# Patient Record
Sex: MALE | Race: WHITE | NOT HISPANIC OR LATINO | Employment: FULL TIME | URBAN - NONMETROPOLITAN AREA
[De-identification: names, ages, dates, MRNs, and addresses within clinical notes are randomized per-mention and may not be internally consistent; named-entity substitution may affect disease eponyms.]

---

## 2017-07-25 ENCOUNTER — OFFICE VISIT (OUTPATIENT)
Dept: CARDIOLOGY | Facility: CLINIC | Age: 42
End: 2017-07-25

## 2017-07-25 VITALS
DIASTOLIC BLOOD PRESSURE: 91 MMHG | BODY MASS INDEX: 37.19 KG/M2 | SYSTOLIC BLOOD PRESSURE: 153 MMHG | HEART RATE: 81 BPM | OXYGEN SATURATION: 96 % | HEIGHT: 77 IN | WEIGHT: 315 LBS

## 2017-07-25 DIAGNOSIS — R06.02 SHORTNESS OF BREATH: ICD-10-CM

## 2017-07-25 DIAGNOSIS — R53.83 FATIGUE, UNSPECIFIED TYPE: ICD-10-CM

## 2017-07-25 DIAGNOSIS — I10 ESSENTIAL HYPERTENSION: ICD-10-CM

## 2017-07-25 PROCEDURE — 99203 OFFICE O/P NEW LOW 30 MIN: CPT | Performed by: PHYSICIAN ASSISTANT

## 2017-07-25 PROCEDURE — 93000 ELECTROCARDIOGRAM COMPLETE: CPT | Performed by: PHYSICIAN ASSISTANT

## 2017-07-25 RX ORDER — MELOXICAM 15 MG/1
15 TABLET ORAL DAILY PRN
COMMUNITY

## 2017-07-25 RX ORDER — LISINOPRIL 20 MG/1
20 TABLET ORAL DAILY
COMMUNITY

## 2017-07-25 RX ORDER — ATORVASTATIN CALCIUM 20 MG/1
20 TABLET, FILM COATED ORAL NIGHTLY
COMMUNITY

## 2017-07-25 RX ORDER — CITALOPRAM 20 MG/1
20 TABLET ORAL DAILY
COMMUNITY

## 2017-07-25 NOTE — PROGRESS NOTES
Subjective   Richie Ahumada is a 42 y.o. male     Chief Complaint   Patient presents with   • Establish Care   • Fatigue       HPI  The patient presents today for initial evaluation.  He presents because of fatigue and shortness of air.  His concern is with his family history from cardiac standpoint.  The patient denies cardiac issues in the past.  He is followed for hypertension, dyslipidemia, and sleep apnea.  Recently, his fatigue is become significant.  He reports that his exercise capacity is very low because of the level of fatigue.  He does not recover very quickly either.  With levels of activity, he will develop shortness of air as well.  He has had no symptoms of chest pain.  He relates no PND orthopnea.  He has minimal lower extremity edema.  Blood pressures typically are well controlled when checked on current medical regimen.  Recent labs including lipid parameters, thyroid, hematologic panel, etc. all were normal by patient report.  He has no further complaints otherwise and again is referred in the setting of all the above.      Current Outpatient Prescriptions   Medication Sig Dispense Refill   • atorvastatin (LIPITOR) 20 MG tablet Take 20 mg by mouth Every Night.     • citalopram (CeleXA) 20 MG tablet Take 20 mg by mouth Daily.     • lisinopril (PRINIVIL,ZESTRIL) 20 MG tablet Take 20 mg by mouth Daily.     • meloxicam (MOBIC) 15 MG tablet Take 15 mg by mouth Daily As Needed.       No current facility-administered medications for this visit.        Review of patient's allergies indicates no known allergies.    Past Medical History:   Diagnosis Date   • Fatigue    • Hypertension    • Sleep apnea     c-pap, not followed by pulm.       Social History     Social History   • Marital status:      Spouse name: N/A   • Number of children: N/A   • Years of education: N/A     Occupational History   • Not on file.     Social History Main Topics   • Smoking status: Never Smoker   • Smokeless tobacco: Not  "on file   • Alcohol use No   • Drug use: No   • Sexual activity: Not on file     Other Topics Concern   • Not on file     Social History Narrative   • No narrative on file       Family History   Problem Relation Age of Onset   • Thyroid cancer Mother    • Hypertension Father    • Heart attack Father        Review of Systems   Constitutional: Positive for fatigue.   HENT: Negative.    Eyes: Negative.    Respiratory: Negative.    Cardiovascular: Negative.    Gastrointestinal: Negative.    Endocrine: Negative.    Genitourinary: Negative.    Musculoskeletal: Positive for arthralgias and myalgias.   Skin: Negative.    Allergic/Immunologic: Positive for environmental allergies.   Neurological: Negative.    Hematological: Negative.    Psychiatric/Behavioral: Negative.        Objective     /91 (BP Location: Left arm, Patient Position: Sitting)  Pulse 81  Ht 77\" (195.6 cm)  Wt (!) 454 lb 6.4 oz (206 kg)  SpO2 96%  BMI 53.88 kg/m2    Lab Results (most recent)     None          Physical Exam   Constitutional: He is oriented to person, place, and time. He appears well-developed and well-nourished. No distress.   HENT:   Head: Normocephalic and atraumatic.   Eyes: Conjunctivae and EOM are normal. Pupils are equal, round, and reactive to light.   Neck: Normal range of motion. Neck supple. No JVD present. No tracheal deviation present.   Cardiovascular: Normal rate, regular rhythm, normal heart sounds and intact distal pulses.    Pulmonary/Chest: Effort normal and breath sounds normal.   Abdominal: Soft. Bowel sounds are normal. He exhibits no distension and no mass. There is no tenderness. There is no rebound and no guarding.   Musculoskeletal: Normal range of motion. He exhibits no edema, tenderness or deformity.   Neurological: He is alert and oriented to person, place, and time.   Skin: Skin is warm and dry. No rash noted. No erythema. No pallor.   Psychiatric: He has a normal mood and affect. His behavior is " normal. Judgment and thought content normal.   Nursing note and vitals reviewed.      Procedure     ECG 12 Lead  Date/Time: 7/25/2017 9:47 AM  Performed by: JAZMYN MICHAEL  Authorized by: JAZMYN MICHAEL   Comments: Sinus rhythm, normal axis, no acute changes noted.                 Assessment/Plan      Diagnosis Plan   1. Fatigue, unspecified type  ECG 12 Lead    Treadmill Stress Test    Adult Transthoracic Echo Complete   2. Essential hypertension  ECG 12 Lead    Treadmill Stress Test    Adult Transthoracic Echo Complete   3. Shortness of breath  Treadmill Stress Test    Adult Transthoracic Echo Complete     With symptoms and risk factors all as above, I will schedule the patient for regular treadmill stress test and an echo.  I will make no changes in medications at current time.  I do want see him back after studies and recommend further to him at that time.  He will call for any issues prior to follow-up.